# Patient Record
Sex: MALE | Race: WHITE | Employment: FULL TIME | URBAN - METROPOLITAN AREA
[De-identification: names, ages, dates, MRNs, and addresses within clinical notes are randomized per-mention and may not be internally consistent; named-entity substitution may affect disease eponyms.]

---

## 2020-06-11 ENCOUNTER — HOSPITAL ENCOUNTER (OUTPATIENT)
Dept: GENERAL RADIOLOGY | Facility: HOSPITAL | Age: 65
Discharge: HOME | End: 2020-06-11
Attending: SPECIALIST
Payer: COMMERCIAL

## 2020-06-11 DIAGNOSIS — R06.2 WHEEZING: ICD-10-CM

## 2020-06-11 PROCEDURE — 71046 X-RAY EXAM CHEST 2 VIEWS: CPT | Performed by: SPECIALIST

## 2021-12-13 ENCOUNTER — TELEPHONE (OUTPATIENT)
Dept: OTOLARYNGOLOGY | Facility: CLINIC | Age: 66
End: 2021-12-13

## 2021-12-14 NOTE — TELEPHONE ENCOUNTER
Is using it as an irrigation. Just refill 10 ml of this. The patient will mix it. Please ask him to follow up if not better.

## 2021-12-17 NOTE — TELEPHONE ENCOUNTER
He takes the 10 ml and removes 10 ml saline from a saline bottle. He then adds the eye drops and uses 1 spray to each nostril twice daily.

## 2021-12-18 RX ORDER — TOBRAMYCIN AND DEXAMETHASONE 3; 1 MG/ML; MG/ML
SUSPENSION/ DROPS OPHTHALMIC
Qty: 10 ML | Refills: 0 | Status: SHIPPED | OUTPATIENT
Start: 2021-12-18

## 2021-12-18 RX ORDER — TOBRAMYCIN AND DEXAMETHASONE 3; 1 MG/ML; MG/ML
SUSPENSION/ DROPS OPHTHALMIC
Qty: 10 ML | Refills: 0 | Status: SHIPPED | OUTPATIENT
Start: 2021-12-18 | End: 2021-12-18

## 2024-12-17 ENCOUNTER — OFFICE VISIT (OUTPATIENT)
Dept: OTOLARYNGOLOGY | Facility: CLINIC | Age: 69
End: 2024-12-17

## 2024-12-17 VITALS — BODY MASS INDEX: 45.1 KG/M2 | HEIGHT: 70 IN | WEIGHT: 315 LBS

## 2024-12-17 DIAGNOSIS — R09.82 POSTNASAL DRIP: ICD-10-CM

## 2024-12-17 DIAGNOSIS — R04.0 EPISTAXIS: Primary | ICD-10-CM

## 2024-12-17 PROCEDURE — 31575 DIAGNOSTIC LARYNGOSCOPY: CPT | Performed by: SPECIALIST

## 2024-12-17 PROCEDURE — 99203 OFFICE O/P NEW LOW 30 MIN: CPT | Performed by: SPECIALIST

## 2024-12-17 RX ORDER — ENALAPRIL MALEATE 5 MG/1
5 TABLET ORAL DAILY
COMMUNITY

## 2024-12-17 RX ORDER — AMLODIPINE BESYLATE 10 MG/1
1 TABLET ORAL DAILY
COMMUNITY

## 2024-12-17 RX ORDER — SILDENAFIL CITRATE 20 MG/1
TABLET ORAL
COMMUNITY
Start: 2024-07-01

## 2024-12-17 RX ORDER — METOPROLOL SUCCINATE 25 MG/1
1 TABLET, EXTENDED RELEASE ORAL DAILY
COMMUNITY
Start: 2024-10-28

## 2024-12-17 RX ORDER — ALLOPURINOL 100 MG/1
100 TABLET ORAL DAILY
COMMUNITY

## 2024-12-17 RX ORDER — WARFARIN SODIUM 7.5 MG/1
TABLET ORAL
COMMUNITY
Start: 2024-12-01

## 2024-12-17 RX ORDER — FEXOFENADINE HCL 180 MG/1
1 TABLET ORAL DAILY
COMMUNITY

## 2024-12-17 RX ORDER — ATORVASTATIN CALCIUM 40 MG/1
1 TABLET, FILM COATED ORAL DAILY
COMMUNITY
Start: 2022-03-28

## 2024-12-17 RX ORDER — ASPIRIN 81 MG/1
TABLET, CHEWABLE ORAL
COMMUNITY

## 2024-12-17 RX ORDER — HYDROCORTISONE 25 MG/G
CREAM TOPICAL
COMMUNITY
Start: 2024-12-06

## 2024-12-17 RX ORDER — FUROSEMIDE 40 MG/1
40 TABLET ORAL DAILY
COMMUNITY

## 2024-12-17 RX ORDER — GENTAMICIN SULFATE 1 MG/G
CREAM TOPICAL
COMMUNITY
Start: 2024-05-30

## 2024-12-17 RX ORDER — SPIRONOLACTONE 25 MG/1
1 TABLET ORAL DAILY
COMMUNITY
Start: 2024-02-02

## 2024-12-17 NOTE — PROGRESS NOTES
Bismark Erickson is a 69 year old male.   Chief Complaint   Patient presents with    New Patient Chief Complaint     Postnasal drip; mucus in throat     HPI:   Patient here with phlegm stuck in his throat.  Also has had some intermittent right epistaxis.  Patient on Coumadin for history of pulmonary embolism and on chronic Keflex for history of sepsis due to lower limb infection    Current Outpatient Medications   Medication Sig Dispense Refill    allopurinol 100 MG Oral Tab Take 1 tablet (100 mg total) by mouth daily.      amLODIPine 10 MG Oral Tab Take 1 tablet (10 mg total) by mouth daily.      aspirin 81 MG Oral Chew Tab Chew 1 tablet every day by oral route.      atorvastatin 40 MG Oral Tab Take 1 tablet (40 mg total) by mouth daily.      enalapril 5 MG Oral Tab Take 1 tablet (5 mg total) by mouth daily.      furosemide 40 MG Oral Tab Take 1 tablet (40 mg total) by mouth daily.      fexofenadine (ALLEGRA ALLERGY) 180 MG Oral Tab Take 1 tablet (180 mg total) by mouth daily.      gentamicin 0.1 % External Cream APPLY A THIN LAYER TO OPEN SORES ON LEGS TWICE DAILY FOR 2 WEEKS FOLLOWED BY A 1 WEEK BREAK, REPEAT AS NEEDED      hydrocortisone 2.5 % External Cream Apply a thin layer to affected areas on face and groin twice a day for up to 2 weeks followed by a 1 week break, repeat as needed.      metoprolol succinate ER 25 MG Oral Tablet 24 Hr Take 1 tablet (25 mg total) by mouth daily.      sildenafil 20 MG Oral Tab       spironolactone 25 MG Oral Tab Take 1 tablet (25 mg total) by mouth daily.      warfarin 7.5 MG Oral Tab       tobramycin-dexamethasone 0.3-0.1 % Ophthalmic Suspension For sinus irrigation mix in saline 10ml saline solution 1 spray to each nostril twice daily 10 mL 0      Past Medical History:    Essential hypertension    Hyperlipidemia    Sleep apnea      Social History:  Social History     Socioeconomic History    Marital status:    Tobacco Use    Smoking status: Former     Types: Cigarettes     Smokeless tobacco: Never   Vaping Use    Vaping status: Never Used   Substance and Sexual Activity    Alcohol use: Not Currently     Comment: once in a while    Drug use: Never     Social Drivers of Health     Food Insecurity: Low Risk  (7/9/2024)    Received from [facility]    Food Insecurity     Have there been times that your food ran out, and you didn't have money to get more?: No     Are there times that you worry that this might happen?: No   Transportation Needs: Low Risk  (7/9/2024)    Received from [facility]    Transportation Needs     Do you have trouble getting transportation to medical appointments?: No   Housing Stability: Low Risk  (7/9/2024)    Received from [facility]    Housing Stability     Are you worried that your electric, gas, oil, or water might be shut off?: No     Are you concerned about having a safe and reliable place to live?: No        REVIEW OF SYSTEMS:   GENERAL HEALTH: feels well otherwise  GENERAL : denies fever, chills, sweats, weight loss, weight gain  SKIN: denies any unusual skin lesions or rashes  RESPIRATORY: denies shortness of breath with exertion  NEURO: denies headaches    EXAM:   Ht 5' 10\" (1.778 m)   Wt (!) 390 lb (176.9 kg)   BMI 55.96 kg/m²   System Details   Skin Inspection - Normal.   Constitutional Overall appearance - Normal.   Head/Face Facial features - Normal. Eyebrows - Normal. Skull - Normal.   Eyes Conjunctiva - Right: Normal, Left: Normal. Pupil - Right: Normal, Left: Normal.    Ears Inspection - Right: Normal, Left: Normal.   Canal - Right: Normal, Left: Normal.    TM - Right: Normal, Left: Normal.   Nasal External nose - Normal.   Nasal septum - Normal.  Turbinates -slight blood in the right lateral nares.  Thick mucoid postnasal drainage by fiberoptic exam   Oral/Oropharynx Lips - Normal, Tonsils - Normal, Tongue - Normal    Neck Exam Inspection - Normal. Palpation - Normal. Parotid  gland - Normal. Thyroid gland - Normal.   Lymph Detail Submental. Submandibular. Anterior cervical. Posterior cervical. Supraclavicular.  All without enlargement   Psychiatric Orientation - Oriented to time, place, person & situation. Appropriate mood and affect.   Neurological Memory - Normal. Cranial nerves - Cranial nerves II through XII grossly intact.   Nasopharynx Thick mucoid postnasal drip but no other abnormalities by fiberoptic exam   Larynx Consent was obtained for the procedure.  The nose was asesthetized with 1% neosynephrine and 4% lidocaine topical drops.    The scope was placed into the bilateral nares as well as the nasopharynx, hypopharynx and larynx.    All of which were examined.  The  entire larynx including the vallecula, epiglottis, true and false vocal cords, aryepiglottic folds, piriform sinuses and post cricord area were examined for tumors and infectious processes as well as for evidence of reflux and retained secretions.  Vocal cord mobility was also assessed.    Any abnormalities are noted in the exam section.   Some mucoid drainage over the vocal cords.  No tumors or masses seen.  Normal vocal cord mobility.  No retained secretions.     ASSESSMENT AND PLAN:   1. Epistaxis  On the right.  Patient on chronic Coumadin and Keflex.  Patient to try a hypertonic saline mist spray.    2. Postnasal drip  Can also try Mucinex.  Patient to call me if this does not improve his symptoms.      The patient indicates understanding of these issues and agrees to the plan.      Tesha Echavarria MD  12/17/2024  5:09 PM

## 2024-12-17 NOTE — PATIENT INSTRUCTIONS
I asked her to try a hypertonic nasal saline mist for your mucoid postnasal drainage.  You can also try some over-the-counter guaifenesin.  Slight bloody crusting in the right nares but no other abnormalities noted on fiberoptic exam.  Please call me if this does not improve or resolve your symptoms.